# Patient Record
Sex: MALE | Race: OTHER | Employment: FULL TIME | URBAN - METROPOLITAN AREA
[De-identification: names, ages, dates, MRNs, and addresses within clinical notes are randomized per-mention and may not be internally consistent; named-entity substitution may affect disease eponyms.]

---

## 2022-07-15 ENCOUNTER — HOSPITAL ENCOUNTER (EMERGENCY)
Dept: GENERAL RADIOLOGY | Age: 42
Discharge: HOME | End: 2022-07-18
Payer: COMMERCIAL

## 2022-07-15 ENCOUNTER — HOSPITAL ENCOUNTER (EMERGENCY)
Age: 42
Discharge: HOME | End: 2022-07-15
Attending: EMERGENCY MEDICINE
Payer: COMMERCIAL

## 2022-07-15 VITALS
HEART RATE: 86 BPM | DIASTOLIC BLOOD PRESSURE: 96 MMHG | HEIGHT: 72 IN | WEIGHT: 210 LBS | OXYGEN SATURATION: 95 % | RESPIRATION RATE: 17 BRPM | BODY MASS INDEX: 28.44 KG/M2 | TEMPERATURE: 98.3 F | SYSTOLIC BLOOD PRESSURE: 142 MMHG

## 2022-07-15 DIAGNOSIS — R07.89 ATYPICAL CHEST PAIN: Primary | ICD-10-CM

## 2022-07-15 DIAGNOSIS — K21.9 GASTROESOPHAGEAL REFLUX DISEASE, UNSPECIFIED WHETHER ESOPHAGITIS PRESENT: ICD-10-CM

## 2022-07-15 LAB
ALBUMIN SERPL-MCNC: 4.1 G/DL (ref 3.5–5)
ALBUMIN/GLOB SERPL: 1.2 {RATIO} (ref 1.2–3.5)
ALP SERPL-CCNC: 76 U/L (ref 50–136)
ALT SERPL-CCNC: 133 U/L (ref 12–65)
ANION GAP SERPL CALC-SCNC: 7 MMOL/L (ref 7–16)
AST SERPL-CCNC: 90 U/L (ref 15–37)
BASOPHILS # BLD: 0 K/UL (ref 0–0.2)
BASOPHILS NFR BLD: 1 % (ref 0–2)
BILIRUB SERPL-MCNC: 1.1 MG/DL (ref 0.2–1.1)
BUN SERPL-MCNC: 12 MG/DL (ref 6–23)
CALCIUM SERPL-MCNC: 9.1 MG/DL (ref 8.3–10.4)
CHLORIDE SERPL-SCNC: 104 MMOL/L (ref 98–107)
CO2 SERPL-SCNC: 27 MMOL/L (ref 21–32)
CREAT SERPL-MCNC: 0.93 MG/DL (ref 0.8–1.5)
D DIMER PPP FEU-MCNC: <0.27 UG/ML(FEU)
DIFFERENTIAL METHOD BLD: ABNORMAL
EKG ATRIAL RATE: 95 BPM
EKG DIAGNOSIS: NORMAL
EKG P AXIS: 46 DEGREES
EKG P-R INTERVAL: 172 MS
EKG Q-T INTERVAL: 358 MS
EKG QRS DURATION: 86 MS
EKG QTC CALCULATION (BAZETT): 449 MS
EKG R AXIS: 50 DEGREES
EKG T AXIS: 31 DEGREES
EKG VENTRICULAR RATE: 95 BPM
EOSINOPHIL # BLD: 0.2 K/UL (ref 0–0.8)
EOSINOPHIL NFR BLD: 4 % (ref 0.5–7.8)
ERYTHROCYTE [DISTWIDTH] IN BLOOD BY AUTOMATED COUNT: 12.2 % (ref 11.9–14.6)
GLOBULIN SER CALC-MCNC: 3.3 G/DL (ref 2.3–3.5)
GLUCOSE SERPL-MCNC: 116 MG/DL (ref 65–100)
HCT VFR BLD AUTO: 45.4 % (ref 41.1–50.3)
HGB BLD-MCNC: 16.1 G/DL (ref 13.6–17.2)
IMM GRANULOCYTES # BLD AUTO: 0 K/UL (ref 0–0.5)
IMM GRANULOCYTES NFR BLD AUTO: 0 % (ref 0–5)
LYMPHOCYTES # BLD: 1.8 K/UL (ref 0.5–4.6)
LYMPHOCYTES NFR BLD: 37 % (ref 13–44)
MAGNESIUM SERPL-MCNC: 2.3 MG/DL (ref 1.8–2.4)
MCH RBC QN AUTO: 31.3 PG (ref 26.1–32.9)
MCHC RBC AUTO-ENTMCNC: 35.5 G/DL (ref 31.4–35)
MCV RBC AUTO: 88.2 FL (ref 79.6–97.8)
MONOCYTES # BLD: 0.5 K/UL (ref 0.1–1.3)
MONOCYTES NFR BLD: 10 % (ref 4–12)
NEUTS SEG # BLD: 2.5 K/UL (ref 1.7–8.2)
NEUTS SEG NFR BLD: 49 % (ref 43–78)
NRBC # BLD: 0 K/UL (ref 0–0.2)
PLATELET # BLD AUTO: 194 K/UL (ref 150–450)
PMV BLD AUTO: 9.9 FL (ref 9.4–12.3)
POTASSIUM SERPL-SCNC: 3.4 MMOL/L (ref 3.5–5.1)
PROT SERPL-MCNC: 7.4 G/DL (ref 6.3–8.2)
RBC # BLD AUTO: 5.15 M/UL (ref 4.23–5.6)
SODIUM SERPL-SCNC: 138 MMOL/L (ref 136–145)
TROPONIN I SERPL HS-MCNC: 4.2 PG/ML (ref 0–14)
WBC # BLD AUTO: 5 K/UL (ref 4.3–11.1)

## 2022-07-15 PROCEDURE — 84484 ASSAY OF TROPONIN QUANT: CPT

## 2022-07-15 PROCEDURE — 83735 ASSAY OF MAGNESIUM: CPT

## 2022-07-15 PROCEDURE — 85025 COMPLETE CBC W/AUTO DIFF WBC: CPT

## 2022-07-15 PROCEDURE — 71046 X-RAY EXAM CHEST 2 VIEWS: CPT

## 2022-07-15 PROCEDURE — 80053 COMPREHEN METABOLIC PANEL: CPT

## 2022-07-15 PROCEDURE — 99285 EMERGENCY DEPT VISIT HI MDM: CPT

## 2022-07-15 PROCEDURE — 6370000000 HC RX 637 (ALT 250 FOR IP): Performed by: PHYSICIAN ASSISTANT

## 2022-07-15 PROCEDURE — 85379 FIBRIN DEGRADATION QUANT: CPT

## 2022-07-15 RX ORDER — POTASSIUM CHLORIDE 20 MEQ/1
20 TABLET, EXTENDED RELEASE ORAL ONCE
Status: COMPLETED | OUTPATIENT
Start: 2022-07-15 | End: 2022-07-15

## 2022-07-15 RX ORDER — TRAZODONE HYDROCHLORIDE 50 MG/1
TABLET ORAL
COMMUNITY
Start: 2022-06-21 | End: 2022-10-31 | Stop reason: ALTCHOICE

## 2022-07-15 RX ORDER — ATORVASTATIN CALCIUM 40 MG/1
TABLET, FILM COATED ORAL
COMMUNITY
Start: 2022-06-21 | End: 2022-09-16

## 2022-07-15 RX ORDER — LIDOCAINE HYDROCHLORIDE 20 MG/ML
15 SOLUTION OROPHARYNGEAL
Status: DISCONTINUED | OUTPATIENT
Start: 2022-07-15 | End: 2022-07-15 | Stop reason: HOSPADM

## 2022-07-15 RX ORDER — MAGNESIUM HYDROXIDE/ALUMINUM HYDROXICE/SIMETHICONE 120; 1200; 1200 MG/30ML; MG/30ML; MG/30ML
30 SUSPENSION ORAL
Status: COMPLETED | OUTPATIENT
Start: 2022-07-15 | End: 2022-07-15

## 2022-07-15 RX ORDER — LOSARTAN POTASSIUM 50 MG/1
100 TABLET ORAL
COMMUNITY
Start: 2022-06-21 | End: 2022-10-31 | Stop reason: DRUGHIGH

## 2022-07-15 RX ORDER — FAMOTIDINE 20 MG/1
20 TABLET, FILM COATED ORAL 2 TIMES DAILY
Qty: 60 TABLET | Refills: 0 | Status: SHIPPED | OUTPATIENT
Start: 2022-07-15 | End: 2022-10-31

## 2022-07-15 RX ORDER — LISINOPRIL 10 MG/1
TABLET ORAL
COMMUNITY
Start: 2022-06-08 | End: 2022-09-16

## 2022-07-15 RX ADMIN — LIDOCAINE HYDROCHLORIDE 15 ML: 20 SOLUTION ORAL at 08:40

## 2022-07-15 RX ADMIN — POTASSIUM CHLORIDE 20 MEQ: 1500 TABLET, EXTENDED RELEASE ORAL at 09:24

## 2022-07-15 RX ADMIN — ALUMINUM HYDROXIDE, MAGNESIUM HYDROXIDE, DIMETHICONE 30 ML: 200; 200; 20 LIQUID ORAL at 08:40

## 2022-07-15 ASSESSMENT — PAIN DESCRIPTION - ORIENTATION: ORIENTATION: LEFT;UPPER

## 2022-07-15 ASSESSMENT — ENCOUNTER SYMPTOMS
GASTROINTESTINAL NEGATIVE: 1
SHORTNESS OF BREATH: 1

## 2022-07-15 ASSESSMENT — PAIN - FUNCTIONAL ASSESSMENT: PAIN_FUNCTIONAL_ASSESSMENT: 0-10

## 2022-07-15 ASSESSMENT — PAIN DESCRIPTION - ONSET: ONSET: SUDDEN

## 2022-07-15 ASSESSMENT — PAIN DESCRIPTION - LOCATION: LOCATION: CHEST

## 2022-07-15 ASSESSMENT — PAIN SCALES - GENERAL
PAINLEVEL_OUTOF10: 5
PAINLEVEL_OUTOF10: 6
PAINLEVEL_OUTOF10: 0

## 2022-07-15 ASSESSMENT — PAIN DESCRIPTION - DESCRIPTORS: DESCRIPTORS: BURNING

## 2022-07-15 NOTE — ED PROVIDER NOTES
[facility] Emergency Department Provider Note                   PCP:                Frankie Knox PA-C               Age: 43 y.o. Sex: male       ICD-10-CM    1. Atypical chest pain  R07.89       2. Gastroesophageal reflux disease, unspecified whether esophagitis present  K21.9           DISPOSITION Decision To Discharge 07/15/2022 09:17:16 AM        MDM  Number of Diagnoses or Management Options  Atypical chest pain  Gastroesophageal reflux disease, unspecified whether esophagitis present  Diagnosis management comments: Patient is a 51-year-old male who presents with burning chest pain that began last night.  work-up grossly unrevealing. EKG without ischemic changes. Troponin negative. D-dimer negative. Chest x-ray clear. Pain relieved with GI cocktail. Recommended repeat troponin, but pt says pain has been present for at least 12 hours and does not want to repeat troponin. Patient's heart score is 1. I do believe he stable for discharge. He is eager to go home and refuses repeat troponin. Will write for Pepcid to take in addition to his Nexium. He has cardiologist and gastroenterologist.  He is to call them for follow-up and return if worsening in any way. Risk of Complications, Morbidity, and/or Mortality  Presenting problems: moderate  Diagnostic procedures: moderate  Management options: moderate    Patient Progress  Patient progress: resolved       Orders Placed This Encounter   Procedures    XR CHEST (2 VW)    CBC with Auto Differential    CMP    D-Dimer, Quantitative    Troponin    Magnesium    EKG 12 Lead        Landry Moralez is a 43 y.o. male who presents to the Emergency Department with chief complaint of    Chief Complaint   Patient presents with    Chest Pain      Patient is a 51-year-old male with history of hypertension and reflux who presents with left-sided chest pain that began last night when he was coming home from [place]. He says the pain feels like burning. Feels like prior bouts of reflux. Some shortness of breath. No nausea vomiting or diaphoresis. Pain is nonexertional.  Denies history of coronary artery disease. No history of DVT or PE. No recent illness. Review of Systems   Constitutional: Negative. HENT: Negative. Respiratory:  Positive for shortness of breath. Cardiovascular:  Positive for chest pain. Gastrointestinal: Negative. Genitourinary: Negative. All other systems reviewed and are negative. History reviewed. No pertinent past medical history. History reviewed. No pertinent surgical history. No family history on file. Social Connections: Not on file        No Known Allergies     Vitals signs and nursing note reviewed. Patient Vitals for the past 4 hrs:   Temp Pulse Resp BP SpO2   07/15/22 0900 98.3 °F (36.8 °C) 86 17 (!) 142/96 95 %   07/15/22 0830 -- 95 23 (!) 169/90 97 %   07/15/22 0828 97.5 °F (36.4 °C) 92 17 -- 98 %          Physical Exam  Vitals and nursing note reviewed. Constitutional:       General: He is not in acute distress. Appearance: Normal appearance. He is normal weight. He is not ill-appearing or toxic-appearing. HENT:      Head: Normocephalic. Eyes:      Extraocular Movements: Extraocular movements intact. Cardiovascular:      Rate and Rhythm: Normal rate and regular rhythm. Pulses: Normal pulses. Heart sounds: Normal heart sounds. Pulmonary:      Effort: Pulmonary effort is normal.      Breath sounds: Normal breath sounds. Abdominal:      Palpations: Abdomen is soft. Tenderness: There is no abdominal tenderness. Musculoskeletal:         General: No swelling or tenderness. Normal range of motion. Cervical back: Normal range of motion and neck supple. Skin:     General: Skin is warm and dry. Findings: No rash. Neurological:      General: No focal deficit present. Mental Status: He is alert and oriented to person, place, and time.  Mental status is at baseline. Psychiatric:         Mood and Affect: Mood normal.         Behavior: Behavior normal.         Thought Content: Thought content normal.        Procedures      Labs Reviewed   CBC WITH AUTO DIFFERENTIAL - Abnormal; Notable for the following components:       Result Value    MCHC 35.5 (*)     All other components within normal limits   COMPREHENSIVE METABOLIC PANEL - Abnormal; Notable for the following components:    Potassium 3.4 (*)     Glucose 116 (*)      (*)     AST 90 (*)     All other components within normal limits   D-DIMER, QUANTITATIVE   TROPONIN   MAGNESIUM        XR CHEST (2 VW)   Final Result   1. No acute cardiopulmonary abnormality. ED Course as of 07/15/22 0931   Fri Jul 15, 2022   0834 AMB POC EKG ROUTINE W/ 12 LEADS, INTER & REP  EKG: NSR 95, normal axis. No acute ischemic changes. No stemi. Qtc 449. [CINDY]      ED Course User Index  [CINDY] ITA Lerma        Voice dictation software was used during the making of this note. This software is not perfect and grammatical and other typographical errors may be present. This note has not been completely proofread for errors.         Luisa Lerma  07/15/22 100 [facility] [address], [place]  07/15/22 5790

## 2022-07-15 NOTE — ED NOTES
I have reviewed discharge instructions with the patient. The patient verrbalized understanding. Patient left ED via Discharge Method: ambulatory to Home with self. Opportunity for questions and clarification provided. Patient given 1 scripts. To continue your aftercare when you leave the hospital, you may receive an automated call from our care team to check in on how you are doing. This is a free service and part of our promise to provide the best care and service to meet your aftercare needs.  If you have questions, or wish to unsubscribe from this service please call [phone]. Thank you for Choosing our [facility] Emergency Department.           Griselda Mann RN  07/15/22 0856

## 2022-07-15 NOTE — ED TRIAGE NOTES
Pt with c/o chest discomfort left upper, with burning sensation. Pt sts hx of GERD/ AF with RX. Denies SHOB. Pt alert and oriented x3, respiratory rate even, non-labored, vital signs stable, denies nausea, vomiting, diarrhea. No acute distress. Will continue to monitor.

## 2022-09-16 ENCOUNTER — INITIAL CONSULT (OUTPATIENT)
Dept: CARDIOLOGY CLINIC | Age: 42
End: 2022-09-16
Payer: COMMERCIAL

## 2022-09-16 VITALS
SYSTOLIC BLOOD PRESSURE: 152 MMHG | HEART RATE: 80 BPM | BODY MASS INDEX: 32.8 KG/M2 | HEIGHT: 68 IN | DIASTOLIC BLOOD PRESSURE: 98 MMHG | WEIGHT: 216.4 LBS

## 2022-09-16 DIAGNOSIS — R07.9 CHEST PAIN, UNSPECIFIED TYPE: ICD-10-CM

## 2022-09-16 DIAGNOSIS — I10 ESSENTIAL HYPERTENSION: ICD-10-CM

## 2022-09-16 DIAGNOSIS — Z76.89 ENCOUNTER TO ESTABLISH CARE: Primary | ICD-10-CM

## 2022-09-16 PROCEDURE — 93000 ELECTROCARDIOGRAM COMPLETE: CPT | Performed by: INTERNAL MEDICINE

## 2022-09-16 PROCEDURE — 99204 OFFICE O/P NEW MOD 45 MIN: CPT | Performed by: INTERNAL MEDICINE

## 2022-09-16 RX ORDER — SPIRONOLACTONE 25 MG/1
25 TABLET ORAL DAILY
Qty: 30 TABLET | Refills: 3 | Status: SHIPPED | OUTPATIENT
Start: 2022-09-16 | End: 2022-10-20 | Stop reason: SDUPTHER

## 2022-09-16 ASSESSMENT — ENCOUNTER SYMPTOMS
ORTHOPNEA: 0
COUGH: 0
BACK PAIN: 0
BLURRED VISION: 0
HEMOPTYSIS: 0
SHORTNESS OF BREATH: 0
NAUSEA: 0
BLOATING: 0
ABDOMINAL PAIN: 0
DOUBLE VISION: 0
VOMITING: 0

## 2022-09-16 NOTE — PROGRESS NOTES
[address], [address]  PHONE: [phone]    22    NAME:  Mya Pedroza  : 1980  MRN: [id number]         SUBJECTIVE:   Mya Pedroza is a 43 y.o. male seen for a visit regarding the following:     Chief Complaint   Patient presents with    Consultation    Chest Pain       HPI:      No prior hx of CAD. Hx of HTN, HLP    Recent ER visit with CP/left arm pain. States constant noted for months. Also currently noted. Occasional episodes of dizziness/possible near syncope. Vague historian. Can walk over a couple miles with no issues. No exertional CP. Home blood pressures with systolics in the 621Y to 009L. No PND/orthopnea. Past Medical History, Past Surgical History, Family history, Social History, and Medications were all reviewed with the patient today and updated as necessary. No Known Allergies  There is no problem list on file for this patient. Past Medical History:   Diagnosis Date    Hyperlipidemia     Hypertension      Past Surgical History:   Procedure Laterality Date    VASECTOMY       Family History   Problem Relation Age of Onset    Heart Attack Neg Hx     Heart Surgery Neg Hx      Social History     Tobacco Use    Smoking status: Every Day     Packs/day: 0.50     Years: 20.00     Pack years: 10.00     Types: Cigarettes     Passive exposure: Current    Smokeless tobacco: Never   Substance Use Topics    Alcohol use: Yes     Comment: 20/week     Current Outpatient Medications   Medication Sig Dispense Refill    spironolactone (ALDACTONE) 25 MG tablet Take 1 tablet by mouth daily 30 tablet 3    losartan (COZAAR) 50 MG tablet 100 mg      traZODone (DESYREL) 50 MG tablet       famotidine (PEPCID) 20 MG tablet Take 1 tablet by mouth in the morning and 1 tablet before bedtime. (Patient not taking: Reported on 2022) 60 tablet 0     No current facility-administered medications for this visit.        Review of Systems Constitutional: Negative for chills, decreased appetite, fever, malaise/fatigue and weight gain. HENT:  Negative for nosebleeds. Eyes:  Negative for blurred vision and double vision. Cardiovascular:  Positive for chest pain. Negative for claudication, dyspnea on exertion, leg swelling, orthopnea, palpitations, paroxysmal nocturnal dyspnea and syncope. Respiratory:  Negative for cough, hemoptysis and shortness of breath. Endocrine: Negative for cold intolerance and heat intolerance. Hematologic/Lymphatic: Negative for bleeding problem. Skin:  Negative for rash. Musculoskeletal:  Negative for back pain, joint pain, muscle weakness and myalgias. Gastrointestinal:  Negative for bloating, abdominal pain, nausea and vomiting. Genitourinary:  Negative for dysuria. Neurological:  Negative for dizziness, light-headedness and weakness. Psychiatric/Behavioral:  Negative for altered mental status. PHYSICAL EXAM:    BP (!) 152/98   Pulse 80   Ht 5' 8\" (1.727 m)   Wt 216 lb 6.4 oz (98.2 kg)   BMI 32.90 kg/m²      Physical Exam  Constitutional:       Appearance: Normal appearance. HENT:      Head: Normocephalic and atraumatic. Mouth/Throat:      Mouth: Mucous membranes are moist.   Eyes:      Pupils: Pupils are equal, round, and reactive to light. Cardiovascular:      Rate and Rhythm: Normal rate and regular rhythm. Pulses: Normal pulses. Pulmonary:      Effort: Pulmonary effort is normal.      Breath sounds: Normal breath sounds. Abdominal:      General: Bowel sounds are normal. There is no distension. Palpations: Abdomen is soft. Tenderness: There is no abdominal tenderness. Musculoskeletal:         General: No swelling. Cervical back: Normal range of motion. Skin:     General: Skin is warm and dry. Neurological:      General: No focal deficit present. Mental Status: He is alert and oriented to person, place, and time.        Medical problems and test results were reviewed with the patient today. No results found for this or any previous visit (from the past 672 hour(s)). No results found for: CHOL, CHOLPOCT, CHOLX, CHLST, CHOLV, HDL, HDLPOC, HDLC, LDL, LDLC, VLDLC, VLDL, TGLX, TRIGL    No results found for any visits on 09/16/22. ASSESSMENT and PLAN    Xena Roper was seen today for consultation and chest pain. Diagnoses and all orders for this visit:    Encounter to establish care  -     EKG 12 lead    Essential hypertension  -     Transthoracic echocardiogram (TTE) complete with contrast, bubble, strain, and 3D PRN; Future  -     Basic Metabolic Panel; Future    Chest pain, unspecified type    Other orders  -     spironolactone (ALDACTONE) 25 MG tablet; Take 1 tablet by mouth daily      Overall Impression    Chest pain-atypical symptoms. Appears noncardiac. Negative work-up in the ER with high-sensitivity troponins despite prolonged episode. EKG unremarkable today with ongoing symptoms. Hypertension-not controlled. Noted mild hypokalemia during ER visit. Discussed consideration for secondary work-up but less likely and defer at this time. Reassess in the future. Add on Aldactone. Discussed checking BPs with symptoms. Closely monitor home logs of follow-up. Dyspnea-sporadic episodes. Exam euvolemic. Follow-up echocardiogram.  Discussed checking blood pressures as could contribute    Return in about 6 weeks (around 10/28/2022).      Giuliana Blunt MD  9/16/2022  9:08 AM

## 2022-09-29 DIAGNOSIS — I10 ESSENTIAL HYPERTENSION: ICD-10-CM

## 2022-09-29 LAB
ANION GAP SERPL CALC-SCNC: 4 MMOL/L (ref 4–13)
BUN SERPL-MCNC: 15 MG/DL (ref 6–23)
CALCIUM SERPL-MCNC: 9.5 MG/DL (ref 8.3–10.4)
CHLORIDE SERPL-SCNC: 105 MMOL/L (ref 101–110)
CO2 SERPL-SCNC: 26 MMOL/L (ref 21–32)
CREAT SERPL-MCNC: 1 MG/DL (ref 0.8–1.5)
GLUCOSE SERPL-MCNC: 116 MG/DL (ref 65–100)
POTASSIUM SERPL-SCNC: 4.1 MMOL/L (ref 3.5–5.1)
SODIUM SERPL-SCNC: 135 MMOL/L (ref 138–145)

## 2022-10-20 RX ORDER — SPIRONOLACTONE 25 MG/1
25 TABLET ORAL DAILY
Qty: 30 TABLET | Refills: 3 | Status: SHIPPED | OUTPATIENT
Start: 2022-10-20

## 2022-10-31 ENCOUNTER — OFFICE VISIT (OUTPATIENT)
Dept: CARDIOLOGY CLINIC | Age: 42
End: 2022-10-31
Payer: COMMERCIAL

## 2022-10-31 VITALS
DIASTOLIC BLOOD PRESSURE: 82 MMHG | HEART RATE: 96 BPM | SYSTOLIC BLOOD PRESSURE: 146 MMHG | WEIGHT: 221.2 LBS | BODY MASS INDEX: 33.52 KG/M2 | HEIGHT: 68 IN

## 2022-10-31 DIAGNOSIS — R07.9 CHEST PAIN, UNSPECIFIED TYPE: ICD-10-CM

## 2022-10-31 DIAGNOSIS — I10 ESSENTIAL HYPERTENSION: Primary | ICD-10-CM

## 2022-10-31 PROCEDURE — 3079F DIAST BP 80-89 MM HG: CPT | Performed by: INTERNAL MEDICINE

## 2022-10-31 PROCEDURE — 3077F SYST BP >= 140 MM HG: CPT | Performed by: INTERNAL MEDICINE

## 2022-10-31 PROCEDURE — 99214 OFFICE O/P EST MOD 30 MIN: CPT | Performed by: INTERNAL MEDICINE

## 2022-10-31 RX ORDER — LOSARTAN POTASSIUM 100 MG/1
TABLET ORAL
COMMUNITY
Start: 2022-08-16

## 2022-10-31 RX ORDER — LOSARTAN POTASSIUM AND HYDROCHLOROTHIAZIDE 12.5; 1 MG/1; MG/1
1 TABLET ORAL DAILY
Qty: 90 TABLET | Refills: 3 | Status: SHIPPED | OUTPATIENT
Start: 2022-10-31

## 2022-10-31 RX ORDER — LOSARTAN POTASSIUM 100 MG/1
100 TABLET ORAL DAILY
Qty: 90 TABLET | Refills: 3 | Status: CANCELLED | OUTPATIENT
Start: 2022-10-31

## 2022-10-31 ASSESSMENT — ENCOUNTER SYMPTOMS
NAUSEA: 0
VOMITING: 0
BLURRED VISION: 0
HEMOPTYSIS: 0
BACK PAIN: 0
COUGH: 0
DOUBLE VISION: 0
ABDOMINAL PAIN: 0
SHORTNESS OF BREATH: 0
ORTHOPNEA: 0
BLOATING: 0

## 2022-10-31 NOTE — PROGRESS NOTES
[address] [place] [address] Rajat Garcia, [address] [place] [address], [address] [place] [address]  PHONE: [phone]    10/31/22    NAME:  Faye Andrews  : 1980  MRN: [id number]         SUBJECTIVE:   Faye Andrews is a 43 y.o. male seen for a visit regarding the following:     Chief Complaint   Patient presents with    Follow-up     Chest pain/hypertension       HPI:      No prior hx of CAD. Hx of HTN, HLP. Echo from 10/5/2022 with hyperdynamic left ventricular function at 65-70%    No new issues since last visit. Still some persistent atypical left arm pain was triggered prior to ER visit. Improved blood pressures but still with systolics in the 992L to 849D. Prior-from initial visit from 2022, recent ER visit with CP/left arm pain. States constant noted for months. Also currently noted. Occasional episodes of dizziness/possible near syncope. Vague historian. Can walk over a couple miles with no issues. No exertional CP. Home blood pressures with systolics in the 246R to 137U. No PND/orthopnea. Hypertension-not controlled, chest pain-atypical    Past Medical History, Past Surgical History, Family history, Social History, and Medications were all reviewed with the patient today and updated as necessary. No Known Allergies  There is no problem list on file for this patient.     Past Medical History:   Diagnosis Date    Hyperlipidemia     Hypertension      Past Surgical History:   Procedure Laterality Date    VASECTOMY       Family History   Problem Relation Age of Onset    Heart Attack Neg Hx     Heart Surgery Neg Hx      Social History     Tobacco Use    Smoking status: Every Day     Packs/day: 0.50     Years: 20.00     Pack years: 10.00     Types: Cigarettes     Passive exposure: Current    Smokeless tobacco: Never   Substance Use Topics    Alcohol use: Yes     Comment: 20/week     Current Outpatient Medications   Medication Sig Dispense Refill    losartan (COZAAR) 100 MG tablet losartan-hydroCHLOROthiazide (HYZAAR) 100-12.5 MG per tablet Take 1 tablet by mouth daily 90 tablet 3    spironolactone (ALDACTONE) 25 MG tablet Take 1 tablet by mouth daily 30 tablet 3     No current facility-administered medications for this visit. Review of Systems   Constitutional: Negative for chills, decreased appetite, fever, malaise/fatigue and weight gain. HENT:  Negative for nosebleeds. Eyes:  Negative for blurred vision and double vision. Cardiovascular:  Positive for chest pain. Negative for claudication, dyspnea on exertion, leg swelling, orthopnea, palpitations, paroxysmal nocturnal dyspnea and syncope. Respiratory:  Negative for cough, hemoptysis and shortness of breath. Endocrine: Negative for cold intolerance and heat intolerance. Hematologic/Lymphatic: Negative for bleeding problem. Skin:  Negative for rash. Musculoskeletal:  Negative for back pain, joint pain, muscle weakness and myalgias. Gastrointestinal:  Negative for bloating, abdominal pain, nausea and vomiting. Genitourinary:  Negative for dysuria. Neurological:  Negative for dizziness, light-headedness and weakness. Psychiatric/Behavioral:  Negative for altered mental status. PHYSICAL EXAM:    BP (!) 146/82   Pulse 96   Ht 5' 8\" (1.727 m)   Wt 221 lb 3.2 oz (100.3 kg)   BMI 33.63 kg/m²      Physical Exam  Constitutional:       Appearance: Normal appearance. HENT:      Head: Normocephalic and atraumatic. Mouth/Throat:      Mouth: Mucous membranes are moist.   Eyes:      Pupils: Pupils are equal, round, and reactive to light. Cardiovascular:      Rate and Rhythm: Normal rate and regular rhythm. Pulses: Normal pulses. Pulmonary:      Effort: Pulmonary effort is normal.      Breath sounds: Normal breath sounds. Abdominal:      General: Bowel sounds are normal. There is no distension. Palpations: Abdomen is soft. Tenderness: There is no abdominal tenderness.    Musculoskeletal: General: No swelling. Cervical back: Normal range of motion. Skin:     General: Skin is warm and dry. Neurological:      General: No focal deficit present. Mental Status: He is alert and oriented to person, place, and time. Medical problems and test results were reviewed with the patient today.      Recent Results (from the past 672 hour(s))   Transthoracic echocardiogram (TTE) complete with contrast, bubble, strain, and 3D PRN    Collection Time: 10/05/22  7:54 AM   Result Value Ref Range    Body Surface Area 2.17 m2    Fractional Shortening 2D 50 28 - 44 %    LV ESV Index A4C 13 mL/m2    LV EDV Index A4C 43 mL/m2    LV ESV Index A2C 9 mL/m2    LV EDV Index A2C 32 mL/m2    LVIDd Index 1.80 cm/m2    LVIDs Index 0.90 cm/m2    LV RWT Ratio 0.58     LV Mass 2D 134.7 88 - 224 g    LV Mass 2D Index 63.8 49 - 115 g/m2    MV E/A 1.17     E/E' Ratio (Averaged) 8.05     E/E' Lateral 5.86     E/E' Septal 10.25     LA Volume Index BP 17 16 - 34 ml/m2    LA Size Index 1.52 cm/m2    MELY/BSA Peak Velocity 1.8 cm2/m2    LV EDV A2C 67 mL    LV EDV A4C 90 mL    LV ESV A2C 19 mL    LV ESV A4C 27 mL    IVSd 1.1 (A) 0.6 - 1.0 cm    LVIDd 3.8 (A) 4.2 - 5.9 cm    LVIDs 1.9 cm    LVOT Diameter 2.2 cm    LVOT Peak Velocity 1.3 m/s    LVOT Peak Gradient 6 mmHg    LVPWd 1.1 (A) 0.6 - 1.0 cm    LV E' Lateral Velocity 14 cm/s    LV E' Septal Velocity 8 cm/s    LV Ejection Fraction A2C 72 %    LV Ejection Fraction A4C 70 %    EF BP 70 55 - 100 %    LVOT Area 3.8 cm2    LA Minor Axis 4.5 cm    LA Major [place] 4.3 cm    LA Area 2C 14.7 cm2    LA Area 4C 12.5 cm2    LA Volume BP 35 18 - 58 mL    LA Diameter 3.2 cm    AV Peak Velocity 1.3 m/s    AV Peak Velocity 1.3 m/s    AV Peak Gradient 6 mmHg    AV Area by Peak Velocity 3.7 cm2    MV E Wave Deceleration Time 164.0 ms    MV A Velocity 0.70 m/s    MV E Velocity 0.82 m/s    RVIDd 2.9 cm    RV Basal Dimension 3.0 cm    RV Mid Dimension 2.9 cm    TAPSE 2.8 1.7 cm    Left Ventricular Ejection Fraction 68     LVEF MODALITY ECHO      No results found for: CHOL, CHOLPOCT, CHOLX, CHLST, CHOLV, HDL, HDLPOC, HDLC, LDL, LDLC, VLDLC, VLDL, TGLX, TRIGL    No results found for any visits on 10/31/22. ASSESSMENT and PLAN    Camryn Mcfarland was seen today for follow-up. Diagnoses and all orders for this visit:    Essential hypertension    Chest pain, unspecified type    Other orders  -     losartan-hydroCHLOROthiazide (HYZAAR) 100-12.5 MG per tablet; Take 1 tablet by mouth daily      Overall Impression    Chest pain-atypical symptoms. Appears noncardiac. Negative work-up in the ER with high-sensitivity troponins despite prolonged episode. EKG unremarkable today with ongoing symptoms. Hypertension-not controlled. Previously with noted mild hypokalemia during ER visit. Discussed consideration for secondary work-up but less likely and deferred. Added on Aldactone. Improved but still not at goal.  Change losartan over to Hyzaar. Discussed checking BPs with symptoms. Closely monitor home logs of follow-up. Stressed weight loss/diet/exercise    Dyspnea-sporadic episodes. Exam euvolemic. Discussed checking blood pressures as could contribute    Return in about 4 months (around 2/28/2023).      Sarah Medeiros MD  10/31/2022  9:02 AM

## 2023-03-31 RX ORDER — SPIRONOLACTONE 25 MG/1
25 TABLET ORAL DAILY
Qty: 90 TABLET | Refills: 3 | Status: SHIPPED | OUTPATIENT
Start: 2023-03-31

## 2023-07-24 ENCOUNTER — HOSPITAL ENCOUNTER (EMERGENCY)
Age: 43
Discharge: HOME | End: 2023-07-24
Attending: EMERGENCY MEDICINE
Payer: COMMERCIAL

## 2023-07-24 ENCOUNTER — APPOINTMENT (OUTPATIENT)
Dept: GENERAL RADIOLOGY | Age: 43
End: 2023-07-24
Payer: COMMERCIAL

## 2023-07-24 VITALS
WEIGHT: 205 LBS | OXYGEN SATURATION: 92 % | HEIGHT: 68 IN | TEMPERATURE: 98.1 F | SYSTOLIC BLOOD PRESSURE: 133 MMHG | HEART RATE: 87 BPM | DIASTOLIC BLOOD PRESSURE: 81 MMHG | RESPIRATION RATE: 18 BRPM | BODY MASS INDEX: 31.07 KG/M2

## 2023-07-24 DIAGNOSIS — I10 ELEVATED BLOOD PRESSURE READING IN OFFICE WITH DIAGNOSIS OF HYPERTENSION: ICD-10-CM

## 2023-07-24 DIAGNOSIS — R07.9 ACUTE CHEST PAIN: Primary | ICD-10-CM

## 2023-07-24 LAB
ANION GAP SERPL CALC-SCNC: 7 MMOL/L (ref 2–11)
BASOPHILS # BLD: 0 K/UL (ref 0–0.2)
BASOPHILS NFR BLD: 0 % (ref 0–2)
BUN SERPL-MCNC: 11 MG/DL (ref 6–23)
CALCIUM SERPL-MCNC: 9.6 MG/DL (ref 8.3–10.4)
CHLORIDE SERPL-SCNC: 106 MMOL/L (ref 101–110)
CO2 SERPL-SCNC: 27 MMOL/L (ref 21–32)
CREAT SERPL-MCNC: 0.91 MG/DL (ref 0.8–1.5)
DIFFERENTIAL METHOD BLD: ABNORMAL
EKG ATRIAL RATE: 97 BPM
EKG DIAGNOSIS: NORMAL
EKG P AXIS: 49 DEGREES
EKG P-R INTERVAL: 173 MS
EKG Q-T INTERVAL: 347 MS
EKG QRS DURATION: 90 MS
EKG QTC CALCULATION (BAZETT): 441 MS
EKG R AXIS: 58 DEGREES
EKG T AXIS: 30 DEGREES
EKG VENTRICULAR RATE: 97 BPM
EOSINOPHIL # BLD: 0.2 K/UL (ref 0–0.8)
EOSINOPHIL NFR BLD: 3 % (ref 0.5–7.8)
ERYTHROCYTE [DISTWIDTH] IN BLOOD BY AUTOMATED COUNT: 12 % (ref 11.9–14.6)
GLUCOSE SERPL-MCNC: 133 MG/DL (ref 65–100)
HCT VFR BLD AUTO: 44.6 % (ref 41.1–50.3)
HGB BLD-MCNC: 16 G/DL (ref 13.6–17.2)
IMM GRANULOCYTES # BLD AUTO: 0 K/UL (ref 0–0.5)
IMM GRANULOCYTES NFR BLD AUTO: 1 % (ref 0–5)
LYMPHOCYTES # BLD: 1.5 K/UL (ref 0.5–4.6)
LYMPHOCYTES NFR BLD: 28 % (ref 13–44)
MCH RBC QN AUTO: 31.9 PG (ref 26.1–32.9)
MCHC RBC AUTO-ENTMCNC: 35.9 G/DL (ref 31.4–35)
MCV RBC AUTO: 89 FL (ref 82–102)
MONOCYTES # BLD: 0.3 K/UL (ref 0.1–1.3)
MONOCYTES NFR BLD: 6 % (ref 4–12)
NEUTS SEG # BLD: 3.2 K/UL (ref 1.7–8.2)
NEUTS SEG NFR BLD: 62 % (ref 43–78)
NRBC # BLD: 0 K/UL (ref 0–0.2)
PLATELET # BLD AUTO: 222 K/UL (ref 150–450)
PMV BLD AUTO: 10.8 FL (ref 9.4–12.3)
POTASSIUM SERPL-SCNC: 3.5 MMOL/L (ref 3.5–5.1)
RBC # BLD AUTO: 5.01 M/UL (ref 4.23–5.6)
SODIUM SERPL-SCNC: 140 MMOL/L (ref 133–143)
TROPONIN I SERPL HS-MCNC: 3.6 PG/ML (ref 0–14)
TROPONIN I SERPL HS-MCNC: <3 PG/ML (ref 0–14)
WBC # BLD AUTO: 5.2 K/UL (ref 4.3–11.1)

## 2023-07-24 PROCEDURE — 84484 ASSAY OF TROPONIN QUANT: CPT

## 2023-07-24 PROCEDURE — 93005 ELECTROCARDIOGRAM TRACING: CPT | Performed by: EMERGENCY MEDICINE

## 2023-07-24 PROCEDURE — 80048 BASIC METABOLIC PNL TOTAL CA: CPT

## 2023-07-24 PROCEDURE — 85025 COMPLETE CBC W/AUTO DIFF WBC: CPT

## 2023-07-24 PROCEDURE — 6370000000 HC RX 637 (ALT 250 FOR IP): Performed by: EMERGENCY MEDICINE

## 2023-07-24 PROCEDURE — 99285 EMERGENCY DEPT VISIT HI MDM: CPT

## 2023-07-24 PROCEDURE — 96374 THER/PROPH/DIAG INJ IV PUSH: CPT

## 2023-07-24 PROCEDURE — 6360000002 HC RX W HCPCS: Performed by: EMERGENCY MEDICINE

## 2023-07-24 PROCEDURE — 71046 X-RAY EXAM CHEST 2 VIEWS: CPT

## 2023-07-24 RX ORDER — SUCRALFATE 1 G/1
1 TABLET ORAL 4 TIMES DAILY
Qty: 20 TABLET | Refills: 1 | Status: SHIPPED | OUTPATIENT
Start: 2023-07-24 | End: 2023-08-03

## 2023-07-24 RX ORDER — ONDANSETRON 2 MG/ML
4 INJECTION INTRAMUSCULAR; INTRAVENOUS
Status: COMPLETED | OUTPATIENT
Start: 2023-07-24 | End: 2023-07-24

## 2023-07-24 RX ORDER — NITROGLYCERIN 0.4 MG/1
0.4 TABLET SUBLINGUAL EVERY 5 MIN PRN
Status: DISCONTINUED | OUTPATIENT
Start: 2023-07-24 | End: 2023-07-24 | Stop reason: HOSPADM

## 2023-07-24 RX ORDER — ASPIRIN 81 MG/1
324 TABLET, CHEWABLE ORAL
Status: COMPLETED | OUTPATIENT
Start: 2023-07-24 | End: 2023-07-24

## 2023-07-24 RX ORDER — ROSUVASTATIN CALCIUM 10 MG/1
10 TABLET, COATED ORAL DAILY
COMMUNITY

## 2023-07-24 RX ADMIN — ONDANSETRON 4 MG: 2 INJECTION INTRAMUSCULAR; INTRAVENOUS at 10:58

## 2023-07-24 RX ADMIN — ASPIRIN 81 MG 324 MG: 81 TABLET ORAL at 10:57

## 2023-07-24 RX ADMIN — NITROGLYCERIN 0.4 MG: 0.4 TABLET, ORALLY DISINTEGRATING SUBLINGUAL at 11:00

## 2023-07-24 ASSESSMENT — PAIN DESCRIPTION - LOCATION: LOCATION: CHEST

## 2023-07-24 ASSESSMENT — ENCOUNTER SYMPTOMS
SHORTNESS OF BREATH: 0
ABDOMINAL PAIN: 0
COLOR CHANGE: 0
BACK PAIN: 0
COUGH: 0

## 2023-07-24 ASSESSMENT — PAIN DESCRIPTION - DESCRIPTORS: DESCRIPTORS: SHARP

## 2023-07-24 ASSESSMENT — PAIN DESCRIPTION - PAIN TYPE: TYPE: ACUTE PAIN

## 2023-07-24 ASSESSMENT — PAIN DESCRIPTION - ORIENTATION: ORIENTATION: LEFT

## 2023-07-24 ASSESSMENT — PAIN SCALES - GENERAL: PAINLEVEL_OUTOF10: 3

## 2023-07-24 ASSESSMENT — LIFESTYLE VARIABLES
HOW OFTEN DO YOU HAVE A DRINK CONTAINING ALCOHOL: MONTHLY OR LESS
HOW MANY STANDARD DRINKS CONTAINING ALCOHOL DO YOU HAVE ON A TYPICAL DAY: 1 OR 2

## 2023-07-24 ASSESSMENT — PAIN - FUNCTIONAL ASSESSMENT
PAIN_FUNCTIONAL_ASSESSMENT: 0-10
PAIN_FUNCTIONAL_ASSESSMENT: ACTIVITIES ARE NOT PREVENTED

## 2023-07-24 NOTE — ED NOTES
After 2 nitro 0.4mg tablets pt reports chest pain is resolved. C/o mild headache. BP: 133/71. Jewel Pierre MD updated.      Marie Martinez RN  07/24/23 3069

## 2023-07-24 NOTE — ED PROVIDER NOTES
Emergency Department Provider Note       PCP: Ter Mckeon PA-C   Age: 37 y.o. Sex: male     DISPOSITION Decision To Discharge 07/24/2023 01:22:02 PM       ICD-10-CM    1. Acute chest pain  R07.9       2. Elevated blood pressure reading in office with diagnosis of hypertension  I10           Medical Decision Making   Substernal discomfort with elevated blood pressure. Check EKG and serial troponins. Recheck BP. Try nitroglycerin for both blood pressure and assessment of chest pain. Check chest x-ray. Doubt pulmonary embolism    PERC  This pt's pretest probability is <15% & answers is no to all ?s  Rules out PE if all criteria are present and pre-test probability is ?15%. Age > 48: no   HR ? 100: no   O2 Sat on Room Air < 95%: no   Prior History of DVT/PE: no   Recent Trauma or Surgery: no   Hemoptysis: no   Exogenous Estrogen: no   Unilateral Leg Swelling: no    Complexity of Problems Addressed:  1 or more acute illnesses that pose a threat to life or bodily function. Data Reviewed and Analyzed:  Category 1:   I independently ordered and reviewed each unique test.  I reviewed external records: provider visit note from outside specialist.   Cardiology note from last October. Normal echo. Treated for hypertension. Category 2:   I independently ordered and interpreted the ED EKG in the absence of a Cardiologist.    Rate: 97  EKG Interpretation: EKG Interpretation: sinus rhythm, no evidence of arrhythmia  ST Segments: Normal ST segments - NO STEMI  I interpreted the X-rays agree with radiologist.  No infiltrate. Category 3: Discussion of management or test interpretation. Blood pressure normalized in department without any particular treatments otherwise. Would not change blood pressure medication. Did refer to cardiology for recheck. Risk of Complications and/or Morbidity of Patient Management:  Prescription drug management performed.   The following clinical decision tools were

## 2023-07-24 NOTE — DISCHARGE INSTRUCTIONS
Call cardiology office to recheck regarding your pain and also recheck your blood pressure. Increase the omeprazole to twice a day for 3 days. Prescription for Carafate for the next few days. Call your primary care doctor to recheck as well. Recheck sooner for worse or worrisome symptoms.

## 2023-11-21 NOTE — TELEPHONE ENCOUNTER
MEDICATION REFILL REQUEST      Name of Medication:  Losartan-HCTZ-  Dose:  100-12.5 mg  Frequency:  QD  Quantity:  90  Days' supply:  90      Pharmacy Name/Location:  [facility]

## 2023-11-22 RX ORDER — LOSARTAN POTASSIUM AND HYDROCHLOROTHIAZIDE 12.5; 1 MG/1; MG/1
1 TABLET ORAL DAILY
Qty: 90 TABLET | Refills: 0 | Status: SHIPPED | OUTPATIENT
Start: 2023-11-22

## 2023-11-22 NOTE — TELEPHONE ENCOUNTER
Requested Prescriptions     Pending Prescriptions Disp Refills    losartan-hydroCHLOROthiazide (HYZAAR) 100-12.5 MG per tablet 90 tablet 0     Sig: Take 1 tablet by mouth daily

## 2023-11-27 RX ORDER — LOSARTAN POTASSIUM AND HYDROCHLOROTHIAZIDE 12.5; 1 MG/1; MG/1
1 TABLET ORAL DAILY
Qty: 90 TABLET | Refills: 0 | Status: SHIPPED | OUTPATIENT
Start: 2023-11-27

## 2023-11-27 NOTE — TELEPHONE ENCOUNTER
Patient called stating he would like a refill for the following :    losartan-hydroCHLOROthiazide (HYZAAR) 100-12.5 MG per tablet  # 90 Day Supply    [facility]  #[id number]  [address]  [phone]    Fax   [phone]      *Patient has an est appt for 12/11 w/Dr Hannah Benitez. See chart note 11/21.

## 2023-12-04 NOTE — TELEPHONE ENCOUNTER
Valerie from [facility] asked for a prescription to be escribed to them for the following pt medications:  Losartan-hydrochlorothiazide 100-12.5 mg tablets  Spironolactone 25 mg tablets    Phone [phone]  Fax [phone]  [address] [address]

## 2023-12-05 NOTE — TELEPHONE ENCOUNTER
Requested Prescriptions     Pending Prescriptions Disp Refills    losartan-hydroCHLOROthiazide (HYZAAR) 100-12.5 MG per tablet 90 tablet 3     Sig: Take 1 tablet by mouth daily    spironolactone (ALDACTONE) 25 MG tablet 90 tablet 3     Sig: Take 1 tablet by mouth daily

## 2023-12-06 RX ORDER — LOSARTAN POTASSIUM AND HYDROCHLOROTHIAZIDE 12.5; 1 MG/1; MG/1
1 TABLET ORAL DAILY
Qty: 90 TABLET | Refills: 3 | Status: SHIPPED | OUTPATIENT
Start: 2023-12-06

## 2023-12-06 RX ORDER — SPIRONOLACTONE 25 MG/1
25 TABLET ORAL DAILY
Qty: 90 TABLET | Refills: 3 | Status: SHIPPED | OUTPATIENT
Start: 2023-12-06

## 2023-12-11 ENCOUNTER — OFFICE VISIT (OUTPATIENT)
Age: 43
End: 2023-12-11
Payer: COMMERCIAL

## 2023-12-11 VITALS
SYSTOLIC BLOOD PRESSURE: 132 MMHG | HEIGHT: 68 IN | HEART RATE: 94 BPM | BODY MASS INDEX: 32.89 KG/M2 | DIASTOLIC BLOOD PRESSURE: 76 MMHG | WEIGHT: 217 LBS

## 2023-12-11 DIAGNOSIS — I10 ESSENTIAL HYPERTENSION: Primary | ICD-10-CM

## 2023-12-11 DIAGNOSIS — R07.2 PRECORDIAL PAIN: ICD-10-CM

## 2023-12-11 PROCEDURE — 3078F DIAST BP <80 MM HG: CPT | Performed by: INTERNAL MEDICINE

## 2023-12-11 PROCEDURE — 99214 OFFICE O/P EST MOD 30 MIN: CPT | Performed by: INTERNAL MEDICINE

## 2023-12-11 PROCEDURE — 3075F SYST BP GE 130 - 139MM HG: CPT | Performed by: INTERNAL MEDICINE

## 2023-12-11 RX ORDER — SPIRONOLACTONE 25 MG/1
25 TABLET ORAL DAILY
Qty: 90 TABLET | Refills: 3 | Status: SHIPPED | OUTPATIENT
Start: 2023-12-11

## 2023-12-11 RX ORDER — ROSUVASTATIN CALCIUM 20 MG/1
20 TABLET, COATED ORAL EVERY EVENING
Qty: 90 TABLET | Refills: 3 | Status: SHIPPED | OUTPATIENT
Start: 2023-12-11

## 2023-12-11 RX ORDER — LOSARTAN POTASSIUM AND HYDROCHLOROTHIAZIDE 12.5; 1 MG/1; MG/1
1 TABLET ORAL DAILY
Qty: 90 TABLET | Refills: 3 | Status: SHIPPED | OUTPATIENT
Start: 2023-12-11

## 2023-12-11 RX ORDER — ROSUVASTATIN CALCIUM 20 MG/1
TABLET, COATED ORAL
COMMUNITY
Start: 2023-11-03 | End: 2023-12-11 | Stop reason: SDUPTHER

## 2023-12-11 ASSESSMENT — ENCOUNTER SYMPTOMS
HEMOPTYSIS: 0
BLURRED VISION: 0
SHORTNESS OF BREATH: 0
ABDOMINAL PAIN: 0
COUGH: 0
NAUSEA: 0
DOUBLE VISION: 0
VOMITING: 0
BACK PAIN: 0
BLOATING: 0
ORTHOPNEA: 0

## 2023-12-11 NOTE — PROGRESS NOTES
[address] [facility]  [id number] [place] [address] Parents, [address]  PHONE: [phone]    23    NAME:  Rosamaria Aviles  : 1980  MRN: [id number]         SUBJECTIVE:   Rosamaria Aviles is a 37 y.o. male seen for a visit regarding the following:     Chief Complaint   Patient presents with    Hypertension       HPI:      No prior hx of CAD. Hx of HTN, HLP. Echo from 10/5/2022 with hyperdynamic left ventricular function at 65-70%    No new issues since last visit. Noted ER visit from 2023 with atypical chest discomfort with negative workup. Similar to prior ER evaluations with atypical chest discomfort. Well-controlled home BPs. States was treated for possible esophagitis with improved symptoms after he underwent a GI workup. Prior-from initial visit from 2022, recent ER visit with CP/left arm pain. States constant noted for months. Also currently noted. Occasional episodes of dizziness/possible near syncope. Vague historian. Can walk over a couple miles with no issues. No exertional CP. Home blood pressures with systolics in the 223I to 276M. No PND/orthopnea. Hypertension-controlled, chest pain-atypical, hyperlipidemia-controlled    Past Medical History, Past Surgical History, Family history, Social History, and Medications were all reviewed with the patient today and updated as necessary. No Known Allergies  There is no problem list on file for this patient.     Past Medical History:   Diagnosis Date    Hyperlipidemia     Hypertension      Past Surgical History:   Procedure Laterality Date    VASECTOMY       Family History   Problem Relation Age of Onset    Heart Attack Neg Hx     Heart Surgery Neg Hx      Social History     Tobacco Use    Smoking status: Every Day     Packs/day: 0.50     Years: 20.00     Additional pack years: 0.00     Total pack years: 10.00     Types: Cigarettes     Passive exposure: Current    Smokeless tobacco: Never   Substance Use Topics

## 2024-12-23 RX ORDER — SPIRONOLACTONE 25 MG/1
25 TABLET ORAL DAILY
Qty: 90 TABLET | Refills: 0 | Status: SHIPPED | OUTPATIENT
Start: 2024-12-23

## 2025-01-03 ENCOUNTER — OFFICE VISIT (OUTPATIENT)
Age: 45
End: 2025-01-03
Payer: COMMERCIAL

## 2025-01-03 VITALS
WEIGHT: 236 LBS | BODY MASS INDEX: 35.77 KG/M2 | SYSTOLIC BLOOD PRESSURE: 132 MMHG | HEART RATE: 84 BPM | DIASTOLIC BLOOD PRESSURE: 84 MMHG | HEIGHT: 68 IN

## 2025-01-03 DIAGNOSIS — R07.2 PRECORDIAL PAIN: ICD-10-CM

## 2025-01-03 DIAGNOSIS — G47.30 SLEEP APNEA, UNSPECIFIED TYPE: ICD-10-CM

## 2025-01-03 DIAGNOSIS — I10 ESSENTIAL HYPERTENSION: Primary | ICD-10-CM

## 2025-01-03 PROCEDURE — 93000 ELECTROCARDIOGRAM COMPLETE: CPT | Performed by: INTERNAL MEDICINE

## 2025-01-03 PROCEDURE — 3079F DIAST BP 80-89 MM HG: CPT | Performed by: INTERNAL MEDICINE

## 2025-01-03 PROCEDURE — 99214 OFFICE O/P EST MOD 30 MIN: CPT | Performed by: INTERNAL MEDICINE

## 2025-01-03 PROCEDURE — 3075F SYST BP GE 130 - 139MM HG: CPT | Performed by: INTERNAL MEDICINE

## 2025-01-03 ASSESSMENT — ENCOUNTER SYMPTOMS
VOMITING: 0
NAUSEA: 0
BLOATING: 0
HEMOPTYSIS: 0
BLURRED VISION: 0
ABDOMINAL PAIN: 0
COUGH: 0
ORTHOPNEA: 0
DOUBLE VISION: 0
SHORTNESS OF BREATH: 0
BACK PAIN: 0

## 2025-01-03 NOTE — PROGRESS NOTES
Discussed checking BPs with symptoms.  Closely monitor home logs for follow-up.  Stressed weight loss/diet/exercise    Sleep apnea-some issues with CPAP.  Interested in referral and set up with sleep medicine.    Dyspnea-sporadic episodes.  Exam euvolemic.  Improved.    Hyperlipidemia-on high intensity statin; discussed bringing in labs from PCP.  States stopped his statin therapy which he resumed with some worsening labs and continue current rosuvastatin 20 mg daily.    Return in about 1 year (around 1/3/2026).     Ralph Anderson MD  1/3/2025  9:34 AM

## 2025-01-22 ENCOUNTER — TELEPHONE (OUTPATIENT)
Dept: SLEEP MEDICINE | Age: 45
End: 2025-01-22

## 2025-01-23 ENCOUNTER — OFFICE VISIT (OUTPATIENT)
Dept: SLEEP MEDICINE | Age: 45
End: 2025-01-23

## 2025-01-23 VITALS
DIASTOLIC BLOOD PRESSURE: 91 MMHG | SYSTOLIC BLOOD PRESSURE: 160 MMHG | RESPIRATION RATE: 16 BRPM | OXYGEN SATURATION: 96 % | HEIGHT: 68 IN | BODY MASS INDEX: 36.07 KG/M2 | WEIGHT: 238 LBS | TEMPERATURE: 97.2 F | HEART RATE: 98 BPM

## 2025-01-23 DIAGNOSIS — G47.8 NON-RESTORATIVE SLEEP: ICD-10-CM

## 2025-01-23 DIAGNOSIS — G47.00 PERSISTENT DISORDER OF INITIATING OR MAINTAINING SLEEP: ICD-10-CM

## 2025-01-23 DIAGNOSIS — G47.34 NOCTURNAL HYPOXEMIA: ICD-10-CM

## 2025-01-23 DIAGNOSIS — E66.9 OBESITY (BMI 35.0-39.9 WITHOUT COMORBIDITY): ICD-10-CM

## 2025-01-23 DIAGNOSIS — G47.33 OSA (OBSTRUCTIVE SLEEP APNEA): Primary | ICD-10-CM

## 2025-01-23 DIAGNOSIS — G47.10 HYPERSOMNIA: ICD-10-CM

## 2025-01-23 ASSESSMENT — SLEEP AND FATIGUE QUESTIONNAIRES
HOW LIKELY ARE YOU TO NOD OFF OR FALL ASLEEP WHILE SITTING AND TALKING TO SOMEONE: SLIGHT CHANCE OF DOZING
HOW LIKELY ARE YOU TO NOD OFF OR FALL ASLEEP IN A CAR, WHILE STOPPED FOR A FEW MINUTES IN TRAFFIC: WOULD NEVER DOZE
ESS TOTAL SCORE: 15
HOW LIKELY ARE YOU TO NOD OFF OR FALL ASLEEP WHILE SITTING QUIETLY AFTER LUNCH WITHOUT ALCOHOL: MODERATE CHANCE OF DOZING
HOW LIKELY ARE YOU TO NOD OFF OR FALL ASLEEP WHILE SITTING AND READING: MODERATE CHANCE OF DOZING
HOW LIKELY ARE YOU TO NOD OFF OR FALL ASLEEP WHILE LYING DOWN TO REST IN THE AFTERNOON WHEN CIRCUMSTANCES PERMIT: HIGH CHANCE OF DOZING
HOW LIKELY ARE YOU TO NOD OFF OR FALL ASLEEP WHILE SITTING INACTIVE IN A PUBLIC PLACE: MODERATE CHANCE OF DOZING
HOW LIKELY ARE YOU TO NOD OFF OR FALL ASLEEP WHILE WATCHING TV: HIGH CHANCE OF DOZING

## 2025-01-23 NOTE — PROGRESS NOTES
[facility]  [address]  [place] [address]  [phone]    Patient Name:  Chinedu Hammond  YOB: 1980      Office Visit 1/23/2025    CHIEF COMPLAINT:    Chief Complaint   Patient presents with    Sleep Apnea    New Patient       HISTORY OF PRESENT ILLNESS:      The patient presents in outpatient consultation at the request of Dr. Anderson for management of obstructive sleep apnea.  Significant PMH of hypertension, hyperlipidemia, AHMET, and obesity.     The diagnostic polysomnography was notable for an apnea hypopnea index of 44.5 including 169 obstructive apneas, 48 central apneas, and 127 hypopneas.  Oxygen desaturations are low as 72% were noted with SpO2 less than 88% for a total of 58 minutes of the test.  Significant cardiac arrhythmias were not evident.      He reports that when he was first diagnosed with sleep apnea the provider did not seem to think it was a big deal and he was never started on treatment.  He has more recently seen cardiology for high blood pressure and is now concerned that his blood pressure problems could be related to his sleep apnea.  He did get a CPAP machine from a friend which is on a I-breeze machine set on auto at 4-20 cm H2O.  He has periodically tried to use the machine over the last year but reports that the mask he has tried have not worked.  He does have a fullface mask and a nasal pillow mask.  When trying to wear the nasal pillow mask he reports that his mouth comes open and air just blows out of his mouth.  He has also tried a chinstrap with a nasal pillow mask as well.  States that prior to being diagnosed with sleep apnea his wife was complaining about him snoring and also said that he would stop breathing while he was sleeping.  Reports that when he was first diagnosed he did not have very many symptoms but he has progressively gotten more sleepy during the day and it is hard to stay awake during meetings at work.

## 2025-01-23 NOTE — PATIENT INSTRUCTIONS
Continue to try CPAP 4-20 cm H2O with nightly compliance  Split-night sleep study ordered and will order new CPAP machine and set up after study  Recommendations as above  Follow-up in 4 months or sooner if needed

## 2025-02-07 RX ORDER — LOSARTAN POTASSIUM AND HYDROCHLOROTHIAZIDE 12.5; 1 MG/1; MG/1
1 TABLET ORAL DAILY
Qty: 90 TABLET | Refills: 3 | Status: SHIPPED | OUTPATIENT
Start: 2025-02-07

## 2025-02-17 ENCOUNTER — HOSPITAL ENCOUNTER (OUTPATIENT)
Dept: SLEEP MEDICINE | Age: 45
Discharge: HOME | End: 2025-02-20
Payer: COMMERCIAL

## 2025-02-17 PROCEDURE — 95811 POLYSOM 6/>YRS CPAP 4/> PARM: CPT

## 2025-02-26 ENCOUNTER — TELEPHONE (OUTPATIENT)
Dept: SLEEP MEDICINE | Age: 45
End: 2025-02-26

## 2025-02-26 DIAGNOSIS — G47.33 OSA (OBSTRUCTIVE SLEEP APNEA): Primary | ICD-10-CM

## 2025-02-26 NOTE — TELEPHONE ENCOUNTER
Called to inform pt of severe sleep study results. Pt would like to use a CPAP machine that he already has. Instructed pt to bring machine in so we can program the correct settings according to SS results. Pt is out of town and will not be able to come in until next week. Supply orders will be sent to [facility].

## 2025-03-24 NOTE — TELEPHONE ENCOUNTER
Requested Prescriptions     Pending Prescriptions Disp Refills    spironolactone (ALDACTONE) 25 MG tablet [Pharmacy Med Name: SPIRONOLACTONE TABS 25MG] 90 tablet 3     Sig: TAKE 1 TABLET DAILY     Verified rx in last OV date 1/3/25. Pharmacy confirmed. Erx as requested.

## 2025-03-25 RX ORDER — SPIRONOLACTONE 25 MG/1
25 TABLET ORAL DAILY
Qty: 90 TABLET | Refills: 3 | Status: SHIPPED | OUTPATIENT
Start: 2025-03-25

## 2025-03-27 ENCOUNTER — TELEPHONE (OUTPATIENT)
Age: 45
End: 2025-03-27

## 2025-03-27 NOTE — TELEPHONE ENCOUNTER
MEDICATION REFILL REQUEST      Name of Medication:  losartan / HCTZ   Dose:    Frequency:    Quantity:    Days' supply:  90 day       Pharmacy Name/Location:  [facility] / Please call in today , patient is totally out

## 2025-03-28 RX ORDER — LOSARTAN POTASSIUM AND HYDROCHLOROTHIAZIDE 12.5; 1 MG/1; MG/1
1 TABLET ORAL DAILY
Qty: 90 TABLET | Refills: 3 | Status: SHIPPED | OUTPATIENT
Start: 2025-03-28

## 2025-06-23 RX ORDER — LOSARTAN POTASSIUM AND HYDROCHLOROTHIAZIDE 12.5; 1 MG/1; MG/1
1 TABLET ORAL DAILY
Qty: 90 TABLET | Refills: 3 | Status: SHIPPED | OUTPATIENT
Start: 2025-06-23

## 2025-06-23 NOTE — TELEPHONE ENCOUNTER
MEDICATION REFILL REQUEST      Name of Medication:  Losart/ HTCZ  Dose:  100-12.5 mg  Frequency:    Quantity:    Days' supply:  90 day       Pharmacy Name/Location:  [facility] / Please call in today and please call patient when called in. Patient is almost out

## 2025-09-02 ENCOUNTER — OFFICE VISIT (OUTPATIENT)
Dept: SLEEP MEDICINE | Age: 45
End: 2025-09-02
Payer: COMMERCIAL

## 2025-09-02 VITALS
HEART RATE: 89 BPM | WEIGHT: 227.2 LBS | OXYGEN SATURATION: 93 % | TEMPERATURE: 97.9 F | SYSTOLIC BLOOD PRESSURE: 150 MMHG | BODY MASS INDEX: 34.43 KG/M2 | DIASTOLIC BLOOD PRESSURE: 81 MMHG | HEIGHT: 68 IN | RESPIRATION RATE: 15 BRPM

## 2025-09-02 DIAGNOSIS — G47.33 OSA (OBSTRUCTIVE SLEEP APNEA): Primary | ICD-10-CM

## 2025-09-02 DIAGNOSIS — G47.10 HYPERSOMNIA: ICD-10-CM

## 2025-09-02 DIAGNOSIS — G47.8 NON-RESTORATIVE SLEEP: ICD-10-CM

## 2025-09-02 DIAGNOSIS — G47.34 NOCTURNAL HYPOXEMIA: ICD-10-CM

## 2025-09-02 PROCEDURE — 99213 OFFICE O/P EST LOW 20 MIN: CPT | Performed by: PHYSICIAN ASSISTANT

## 2025-09-02 ASSESSMENT — SLEEP AND FATIGUE QUESTIONNAIRES
HOW LIKELY ARE YOU TO NOD OFF OR FALL ASLEEP WHILE SITTING INACTIVE IN A PUBLIC PLACE: SLIGHT CHANCE OF DOZING
HOW LIKELY ARE YOU TO NOD OFF OR FALL ASLEEP WHILE SITTING QUIETLY AFTER LUNCH WITHOUT ALCOHOL: SLIGHT CHANCE OF DOZING
HOW LIKELY ARE YOU TO NOD OFF OR FALL ASLEEP WHILE SITTING AND READING: SLIGHT CHANCE OF DOZING
HOW LIKELY ARE YOU TO NOD OFF OR FALL ASLEEP WHILE SITTING AND TALKING TO SOMEONE: WOULD NEVER DOZE
HOW LIKELY ARE YOU TO NOD OFF OR FALL ASLEEP IN A CAR, WHILE STOPPED FOR A FEW MINUTES IN TRAFFIC: WOULD NEVER DOZE
HOW LIKELY ARE YOU TO NOD OFF OR FALL ASLEEP WHILE LYING DOWN TO REST IN THE AFTERNOON WHEN CIRCUMSTANCES PERMIT: MODERATE CHANCE OF DOZING
ESS TOTAL SCORE: 7
HOW LIKELY ARE YOU TO NOD OFF OR FALL ASLEEP WHILE WATCHING TV: SLIGHT CHANCE OF DOZING
HOW LIKELY ARE YOU TO NOD OFF OR FALL ASLEEP WHEN YOU ARE A PASSENGER IN A CAR FOR AN HOUR WITHOUT A BREAK: SLIGHT CHANCE OF DOZING